# Patient Record
Sex: FEMALE | Race: OTHER | HISPANIC OR LATINO | URBAN - METROPOLITAN AREA
[De-identification: names, ages, dates, MRNs, and addresses within clinical notes are randomized per-mention and may not be internally consistent; named-entity substitution may affect disease eponyms.]

---

## 2016-12-30 NOTE — ED STATDOCS - GASTROINTESTINAL, MLM
abdomen soft, and non-distended. RUQ and RLQ TTP, voluntary guarding, no rebound.  Bowel sounds present.

## 2016-12-30 NOTE — ED STATDOCS - MEDICAL DECISION MAKING DETAILS
patient with abd pain, mostly RLQ. Notes vaginal bleeding. unknown pregnancy at time of eval. if pregnant, concern for torsion vs ectopic. called US stat to arrange for sonography. urine sample taken for test but awaiting result. if not pregnant concern for appy and will need CT with IV and PO contrast. appropriate lab work ordered and treated symptomatically with fluids, anti emetics ans pain meds. check results and reassesses.

## 2016-12-30 NOTE — ED STATDOCS - PROGRESS NOTE DETAILS
SUDHA paged and made aware of pts clinical condition. Pts VSS, NAD. Will evaluate pt in ED pending labs. SUDHA evaluated pt-- will admit to Dr. Severino.

## 2016-12-30 NOTE — ED STATDOCS - OBJECTIVE STATEMENT
32 y/o F presenting for evaluation of abd pain that began at 2pm today. Abdominal pain is described as sharp and mostly located in RLQ. Pain radiates to epigastric. + nausea, Denies vomiting.   Pt now with vaginal bleeding, onset around 5pm. Denies cough, CP, SOB or other complaints.  Pt is sexually active. LMP 12/18 and she states these sx are not her menses and she is usually regular.

## 2016-12-31 NOTE — H&P ADULT. - ASSESSMENT
31 y.o F [id number] LMP 16 EGA 1.6 complaints of abdominal pain & vaginal bleeding x1 day.    Admit to OB/GYN service

## 2016-12-31 NOTE — H&P ADULT. - PMH
Gastritis    Gestational diabetes    Hypertension    Tachycardia    UTI (lower urinary tract infection)

## 2016-12-31 NOTE — H&P ADULT. - PROBLEM SELECTOR PLAN 1
cbc at 12noon then repeat in am  type and screen   BHCG 323 now but will repeat in am for trend  US repeat in am  LR bolus received in ED will continue at 125 cc/hr  Clear diet for now   Tylenol 975 mg given in ED but will not order any other pain meds due to monitoring for acute abdomin  Admit for 24hours observation to montior abdominal symptoms, patient with early pregnancy cannot exculde ecoptic vs torsion vs rupture cyst.  montior closely

## 2016-12-31 NOTE — H&P ADULT. - GENITOURINARY COMMENTS
No CMT, + vaginal bleeding noted, + R adnexal pain upon palpation uterus normal size left adnexal pain

## 2016-12-31 NOTE — ED ADULT NURSE REASSESSMENT NOTE - NS ED NURSE REASSESS COMMENT FT1
Pt still waiting for bed, spoke with Dr. Salas Katz for pain med order.
pt A&Ox3, resp even and unlabored no distress noted, pt has no complaints at this time, family at bedside, will continue to monitor
Pt received @ 0725 Alert and Oriented to person, place, and time, pt is complaining of upper abdominal pain and thinks its coming from not eating.  Pt also states she is have more vaginal bleeding since yesterday.  Call out to MD.  will cont to monitor.

## 2016-12-31 NOTE — H&P ADULT. - HISTORY OF PRESENT ILLNESS
31 y.o F [id number] LMP 16 EGA 1.6 complaints of abdominal pain & vaginal bleeding x1 day.  The abd pain that began at 2pm today. She described as sharp and mostly located in RLQ. Pain radiates to epigastric. + nausea, Denies vomiting.   Pt now with vaginal bleeding, onset around 5pm. only used 1 pad. Denies cough, CP, SOB or other complaints.  Pt is sexually active. She states these sx are not her menses and she is usually regular.  OBHX 1NSVD, 1 csection due to breech presentation  GYN hx- PAP smear normal

## 2016-12-31 NOTE — CONSULT NOTE ADULT - ATTENDING COMMENTS
I have personally seen and examined this pt in the ER. QSBHCG and pelvic ultrasound results have been reviewed by me. On exam, abd soft without distention, BS present. Mild RLQ>LLQ pain on deep palpation noted. NO REBOUND PRESENT. BUSV small amount of dark blood present. Cx clean without lesions, and NO CMT NOTED. Uterus nl size, NT, mobile. Left adnexa NT without masses. Right adnexa slightly tender to palpation.             I have discussed the possible DX with the pt + . Although this may be an early ectopic pregnancy, she is not acute at this time and her current condition does not warrant laparoscopy, we should not send her home. Additional possibilities might be hemorrhagic cyst, and very early AP. We will admit pt to GYN service, follow CBC and serial QSBHCG closely, and repeat pelvic ultrasound in 24 hrs. If her clinical status changes or pelvic ultrasound evidence becomes more convincing, we will initiate operative intervention

## 2016-12-31 NOTE — CONSULT NOTE ADULT - SUBJECTIVE AND OBJECTIVE BOX
31 y.o F [id number] LMP 16 EGA 1.6 complaints of abdominal pain & vaginal bleeding x1 day.  The abd pain that began at 2pm today. She described as sharp and mostly located in RLQ. Pain radiates to epigastric. + nausea, Denies vomiting.   Pt now with vaginal bleeding, onset around 5pm. only used 1 pad. Denies cough, CP, SOB or other complaints.  Pt is sexually active. She states these sx are not her menses and she is usually regular.  OBHX 1NSVD, 1 csection due to breech presentation  GYN hx- PAP smear normal      VS:   Vital Signs Last 24 Hrs  T(C): 37.7, Max: 38.3 (12-30 @ 21:44)  T(F): 99.9, Max: 101 (12-30 @ 21:44)  HR: 97 (97 - 119)  BP: 128/75 (128/75 - 146/91)  BP(mean): --  RR: 18 (18 - 18)  SpO2: 100% (100% - 100%)    Physical Exam:     General: Pt appears calm and relaxed in bed  Respiratory: BL CTA, no wheezes, rubs, nor rhonchi, good BL air entry  Cardiovascular: regular rate and rhythm, +S1/+S2, no murmurs, rubs, gallops  Gastrointestinal: soft slightly distended, + RLQ pain upon light and deep palpation mild rebound , no guarding +bowel sounds   Extremities: no clubbing, cyanosis, erythema,  No lower extremity edema    Labs:                        13.3   11.50 )-----------( 236      ( 31 Dec 2016 02:37 )             40.3   31 Dec 2016 02:37    136    |  102    |  9.0    ----------------------------<  111    3.5     |  22.0   |  0.46     Ca    9.3        31 Dec 2016 02:37    TPro  7.6    /  Alb  3.9    /  TBili  0.2    /  DBili  x      /  AST  16     /  ALT  18     /  AlkPhos  93     31 Dec 2016 02:37        Medications:  MEDICATIONS  (STANDING):  sodium chloride 0.9% Bolus 1000milliLiter(s) IV Bolus once  sodium chloride 0.9%. 1000milliLiter(s) IV Continuous <Continuous>  EXAM:  US OB LES THAN 14 WKS 1ST GEST                         EXAM:  US OB TRANSVAGINAL                          PROCEDURE DATE:  2016        INTERPRETATION:  CLINICAL INFORMATION: Severe right lower quadrant pain   since 2:00 PM with vaginal bleeding since 5:00 PM. Positive urine   pregnancy examination. Beta-hCG not available at the time of examination   and dictation. Negative UCG on prior emergency room visit on 2016.    LMP: 2016    COMPARISON: None available.    TECHNIQUE: Transabdominal and Endovaginal pelvic sonogram. Color and   Spectral Doppler was performed.    FINDINGS:    Uterus: 9.0 x 7.1 x 5.2 cm. Within normal limits.  Endometrium: 13 mm. Within normal limits.  Right ovary: 6.2 x 5.4 x 4.4 cm inclusive of 2 complex cysts measuring   4.1 x 3.9 x 3.1 cm and 3.9 x 3.6 x 3.2 cm.  Blood flow is demonstrated   utilizing color and spectral Doppler.  Left ovary: 2.8 x 2.4 x 2.1 cm, inclusive of a 1.4 x 1.3 x 1.2 cm cyst.   Nonspecific echogenic subcentimeter focus within the left ovary. Blood   flow demonstrated utilizing color and spectral Doppler.     Free fluid: Large complex pelvic free fluid. No free fluid is identified   within the right upper quadrant.    IMPRESSION:    Enlarged right ovary with 2 complex hemorrhagic cysts. Despite the   presence of arterial and venous blood flow, given the size of the ovary,   the patient is a increased risk for ovarian torsion and intermittent   torsion detorsion is a differential diagnostic consideration.    Large complex pelvic free fluid, may be related to ruptured hemorrhagic   cyst, however in light of the positive urine pregnancy examination and   lack of a intrauterine pregnancy, findings represent pregnancy of unknown   location. Nonvisualized ruptured ectopic pregnancy is a differential   diagnostic consideration, however given recent negative urine pregnancy   examination on 2016, is felt to be less likely. Close interval   follow-up with serial beta-hCG and ultrasound is recommended.    Dr. Subramanian discussed the above findings with JETT Patterson  at 2:05 AM on   2016 with read back.

## 2017-01-01 NOTE — DISCHARGE NOTE OB - CARE PROVIDER_API CALL
Jude Macias (MD), Obstetrics and Gynecology  [address] [place] [address]  Phone: [phone]  Fax: [phone]

## 2017-01-01 NOTE — DISCHARGE NOTE OB - HOSPITAL COURSE
31 year old  at EGA of approx. 1 week 6 days calculated via LMP of 16 came into L/D complaining of right sided lower quadrant pain which began the day prior to admission. The patient denied vomiting, denies fever or chills. Also had 1 days of vaginal bleeding, but no vaginal spotting and no passage of clots.  Ultrasound showed enlarged right ovary with 2 complex hemorrhagic cysts. Patient had positive arterial and venous blood flow. There was large free complex pelvic fluid. Nonvisualized ruptured ectopic pregnancy wasa differential diagnostic consideration, however given recent negative urine pregnancy examination on 2016, is felt to be less likely.  Patient most likely has lutein cyst, repeat beta-hcg is rising from 321 on 16 to 551 on 17. Patient has clinically improved from the day prior, now rates pain 7/10, is ambulating, tolerating PO intake, and is urinating. Patient to be discharged with follow up at the [facility] clinic within 3 to 5 days for repeat beta-hcg.

## 2017-01-01 NOTE — DISCHARGE NOTE OB - PLAN OF CARE
control of symptoms -most likely has luteal cyst, follow up at the [facility] clinic within 3 to 5 days   -will need repeat beta-hcg and repeat transvaginal ultrasound

## 2017-01-01 NOTE — DISCHARGE NOTE OB - PATIENT PORTAL LINK FT
“You can access the Follow[employer] Patient Portal, offered by [facility], by registering with the following website: [email or web address]”

## 2017-01-01 NOTE — PROGRESS NOTE ADULT - PROBLEM SELECTOR PLAN 1
-differentials are early IUP too small to visualize with hemorrhagic cyst of the ovary vs.  ectopic pregnancy  vs. intermittent ovarian torsion  -beta-hcg increasing from yesterday (551 vs 323)  -repeat transvaginal ultrasound due for today  -patient has subjectively improved, pain is now rated 7/10

## 2017-01-01 NOTE — DISCHARGE NOTE OB - MEDICATION SUMMARY - MEDICATIONS TO TAKE
I will START or STAY ON the medications listed below when I get home from the hospital:     mg oral tablet  -- 1 tab(s) by mouth every 6 hours for moderate to severe pain  -- Do not take this drug if you are pregnant.  It is very important that you take or use this exactly as directed.  Do not skip doses or discontinue unless directed by your doctor.  May cause drowsiness or dizziness.  Obtain medical advice before taking any non-prescription drugs as some may affect the action of this medication.  Take with food or milk.    -- Indication: For pain

## 2017-01-01 NOTE — PROGRESS NOTE ADULT - SUBJECTIVE AND OBJECTIVE BOX
31 year old [id number] LMP 16 EGA 1.6 complaints of abdominal pain & vaginal bleeding x1 day.  The abd pain that began at 2pm yesterday. Patient says the abdominal pain is a lot better today than yesterday, now she rates it 7/10 whereas yesterday it was 9/10. Patient is tolerating PO, urinating, bowel movements positive and ambulating. Denies vaginal bleeding, denies vaginal discharge.     Vitals:  Vital Signs Last 24 Hrs  T(C): 36.3, Max: 36.8 ( @ 07:30)  T(F): 97.3, Max: 98.2 ( @ 07:30)  HR: 89 (88 - 98)  BP: 123/79 (118/74 - 132/84)  BP(mean): --  RR: 18 (16 - 18)  SpO2: 98% (98% - 100%)    Physical exam:  General: NAD  Cardiac: s1/s2/rrr  Pulmonary: CTAb  Abdominal: soft/bowel sounds increased/tenderness to palpation in right lower and left lower quadrants, no guarding, no rebound, no rigidity  Extremities: no edema, no calf tenderness no cyanosis     Labs:                          12.9   10.92 )-----------( 238      ( 31 Dec 2016 12:59 )             39.6       31 Dec 2016 02:37    136    |  102    |  9.0    ----------------------------<  111    3.5     |  22.0   |  0.46     Ca    9.3        31 Dec 2016 02:37    TPro  7.6    /  Alb  3.9    /  TBili  0.2    /  DBili  x      /  AST  16     /  ALT  18     /  AlkPhos  93     31 Dec 2016 02:37      beta-hc

## 2017-01-01 NOTE — DISCHARGE NOTE OB - CARE PLAN
Principal Discharge DX:	Lutein cyst  Goal:	control of symptoms  Instructions for follow-up, activity and diet:	-most likely has luteal cyst, follow up at the [facility] clinic within 3 to 5 days   -will need repeat beta-hcg and repeat transvaginal ultrasound Principal Discharge DX:	Lutein cyst  Goal:	control of symptoms  Instructions for follow-up, activity and diet:	-most likely has luteal cyst, follow up at the [facility] clinic within 3 to 5 days   -will need repeat beta-hcg and repeat transvaginal ultrasound Principal Discharge DX:	Lutein cyst  Goal:	control of symptoms  Instructions for follow-up, activity and diet:	-most likely has luteal cyst, follow up at the [facility] clinic within 3 to 5 days   -will need repeat beta-hcg and repeat transvaginal ultrasound

## 2017-01-02 ENCOUNTER — INPATIENT (INPATIENT)
Facility: HOSPITAL | Age: 32
LOS: 1 days | Discharge: HOME | DRG: 779 | End: 2017-01-04
Attending: OBSTETRICS & GYNECOLOGY | Admitting: OBSTETRICS & GYNECOLOGY
Payer: MEDICAID

## 2017-01-02 VITALS
RESPIRATION RATE: 18 BRPM | DIASTOLIC BLOOD PRESSURE: 79 MMHG | OXYGEN SATURATION: 100 % | TEMPERATURE: 98 F | HEART RATE: 110 BPM | SYSTOLIC BLOOD PRESSURE: 132 MMHG

## 2017-01-02 DIAGNOSIS — Z98.89 OTHER SPECIFIED POSTPROCEDURAL STATES: Chronic | ICD-10-CM

## 2017-01-02 DIAGNOSIS — D17.9 BENIGN LIPOMATOUS NEOPLASM, UNSPECIFIED: Chronic | ICD-10-CM

## 2017-01-02 LAB
ALBUMIN SERPL ELPH-MCNC: 4.1 G/DL — SIGNIFICANT CHANGE UP (ref 3.3–5.2)
ALP SERPL-CCNC: 104 U/L — SIGNIFICANT CHANGE UP (ref 40–120)
ALT FLD-CCNC: 24 U/L — SIGNIFICANT CHANGE UP
ANION GAP SERPL CALC-SCNC: 16 MMOL/L — SIGNIFICANT CHANGE UP (ref 5–17)
APPEARANCE UR: ABNORMAL
AST SERPL-CCNC: 17 U/L — SIGNIFICANT CHANGE UP
BASOPHILS # BLD AUTO: 0 K/UL — SIGNIFICANT CHANGE UP (ref 0–0.2)
BASOPHILS NFR BLD AUTO: 0.3 % — SIGNIFICANT CHANGE UP (ref 0–2)
BILIRUB SERPL-MCNC: 0.2 MG/DL — LOW (ref 0.4–2)
BILIRUB UR-MCNC: NEGATIVE — SIGNIFICANT CHANGE UP
BLD GP AB SCN SERPL QL: SIGNIFICANT CHANGE UP
BUN SERPL-MCNC: 8 MG/DL — SIGNIFICANT CHANGE UP (ref 8–20)
CALCIUM SERPL-MCNC: 8.8 MG/DL — SIGNIFICANT CHANGE UP (ref 8.6–10.2)
CHLORIDE SERPL-SCNC: 104 MMOL/L — SIGNIFICANT CHANGE UP (ref 98–107)
CO2 SERPL-SCNC: 21 MMOL/L — LOW (ref 22–29)
COLOR SPEC: YELLOW — SIGNIFICANT CHANGE UP
CREAT SERPL-MCNC: 0.54 MG/DL — SIGNIFICANT CHANGE UP (ref 0.5–1.3)
DIFF PNL FLD: ABNORMAL
EOSINOPHIL # BLD AUTO: 0.1 K/UL — SIGNIFICANT CHANGE UP (ref 0–0.5)
EOSINOPHIL NFR BLD AUTO: 0.8 % — SIGNIFICANT CHANGE UP (ref 0–6)
EPI CELLS # UR: ABNORMAL
GLUCOSE SERPL-MCNC: 95 MG/DL — SIGNIFICANT CHANGE UP (ref 70–115)
GLUCOSE UR QL: NEGATIVE MG/DL — SIGNIFICANT CHANGE UP
HCG SERPL-ACNC: 565.5 MIU/ML — SIGNIFICANT CHANGE UP
HCG UR QL: POSITIVE
HCT VFR BLD CALC: 40.6 % — SIGNIFICANT CHANGE UP (ref 37–47)
HGB BLD-MCNC: 13.3 G/DL — SIGNIFICANT CHANGE UP (ref 12–16)
KETONES UR-MCNC: ABNORMAL
LEUKOCYTE ESTERASE UR-ACNC: ABNORMAL
LYMPHOCYTES # BLD AUTO: 3.5 K/UL — SIGNIFICANT CHANGE UP (ref 1–4.8)
LYMPHOCYTES # BLD AUTO: 33.8 % — SIGNIFICANT CHANGE UP (ref 20–55)
MCHC RBC-ENTMCNC: 27.2 PG — SIGNIFICANT CHANGE UP (ref 27–31)
MCHC RBC-ENTMCNC: 32.8 G/DL — SIGNIFICANT CHANGE UP (ref 32–36)
MCV RBC AUTO: 83 FL — SIGNIFICANT CHANGE UP (ref 81–99)
MONOCYTES # BLD AUTO: 0.8 K/UL — SIGNIFICANT CHANGE UP (ref 0–0.8)
MONOCYTES NFR BLD AUTO: 7.3 % — SIGNIFICANT CHANGE UP (ref 3–10)
NEUTROPHILS # BLD AUTO: 6 K/UL — SIGNIFICANT CHANGE UP (ref 1.8–8)
NEUTROPHILS NFR BLD AUTO: 57.6 % — SIGNIFICANT CHANGE UP (ref 37–73)
NITRITE UR-MCNC: NEGATIVE — SIGNIFICANT CHANGE UP
PH UR: 6 — SIGNIFICANT CHANGE UP (ref 4.8–8)
PLATELET # BLD AUTO: 235 K/UL — SIGNIFICANT CHANGE UP (ref 150–400)
POTASSIUM SERPL-MCNC: 3.4 MMOL/L — LOW (ref 3.5–5.3)
POTASSIUM SERPL-SCNC: 3.4 MMOL/L — LOW (ref 3.5–5.3)
PROT SERPL-MCNC: 7.8 G/DL — SIGNIFICANT CHANGE UP (ref 6.6–8.7)
PROT UR-MCNC: 30 MG/DL
RBC # BLD: 4.89 M/UL — SIGNIFICANT CHANGE UP (ref 4.4–5.2)
RBC # FLD: 13.9 % — SIGNIFICANT CHANGE UP (ref 11–15.6)
RBC CASTS # UR COMP ASSIST: ABNORMAL /HPF (ref 0–4)
SODIUM SERPL-SCNC: 141 MMOL/L — SIGNIFICANT CHANGE UP (ref 135–145)
SP GR SPEC: 1.02 — SIGNIFICANT CHANGE UP (ref 1.01–1.02)
TYPE + AB SCN PNL BLD: SIGNIFICANT CHANGE UP
UROBILINOGEN FLD QL: NEGATIVE MG/DL — SIGNIFICANT CHANGE UP
WBC # BLD: 10.37 K/UL — SIGNIFICANT CHANGE UP (ref 4.8–10.8)
WBC # FLD AUTO: 10.37 K/UL — SIGNIFICANT CHANGE UP (ref 4.8–10.8)
WBC UR QL: SIGNIFICANT CHANGE UP

## 2017-01-02 PROCEDURE — 76801 OB US < 14 WKS SINGLE FETUS: CPT | Mod: 26

## 2017-01-02 PROCEDURE — 93976 VASCULAR STUDY: CPT | Mod: 26

## 2017-01-02 PROCEDURE — 76705 ECHO EXAM OF ABDOMEN: CPT | Mod: 26

## 2017-01-02 PROCEDURE — 76815 OB US LIMITED FETUS(S): CPT | Mod: 26

## 2017-01-02 PROCEDURE — 76817 TRANSVAGINAL US OBSTETRIC: CPT | Mod: 26

## 2017-01-02 RX ORDER — SODIUM CHLORIDE 9 MG/ML
1000 INJECTION INTRAMUSCULAR; INTRAVENOUS; SUBCUTANEOUS
Qty: 0 | Refills: 0 | Status: DISCONTINUED | OUTPATIENT
Start: 2017-01-02 | End: 2017-01-04

## 2017-01-02 RX ORDER — SODIUM CHLORIDE 9 MG/ML
1000 INJECTION INTRAMUSCULAR; INTRAVENOUS; SUBCUTANEOUS ONCE
Qty: 0 | Refills: 0 | Status: COMPLETED | OUTPATIENT
Start: 2017-01-02 | End: 2017-01-02

## 2017-01-02 RX ORDER — ACETAMINOPHEN 500 MG
975 TABLET ORAL ONCE
Qty: 0 | Refills: 0 | Status: COMPLETED | OUTPATIENT
Start: 2017-01-02 | End: 2017-01-02

## 2017-01-02 RX ADMIN — Medication 975 MILLIGRAM(S): at 22:53

## 2017-01-02 RX ADMIN — SODIUM CHLORIDE 1000 MILLILITER(S): 9 INJECTION INTRAMUSCULAR; INTRAVENOUS; SUBCUTANEOUS at 22:53

## 2017-01-02 NOTE — CONSULT NOTE ADULT - SUBJECTIVE AND OBJECTIVE BOX
31 year old female  [id number] LMP 16 EGA 2.1 who presnts to the ED with complaints of abdominal pain & vaginal bleeding. The patient was previously admitted to [facility] for two days because of the same symptoms but was discharged with follow up at the [facility] clinic on Wednesday for repeat beta-hcg and TV ultrasound, however she had one episode of bleeding and abdominal pain and came to the ED. The patient states that the bleeding was bright red, with no passage of clots and no tissues were seen. Denies vertigo, denies shortness of breath or chest pain.    The abdominal pain is located in the RLQ and the patient states that it also radiates to the epigastric region. She states that pain she has in RLQ is pulsating, but the pain she has in the epigastric region is burning and states it is similar to the burning pain she had when she had gastritis previously, but states that it is a bit stronger. She says the pain is 8/10, constant and gets worse with respiration. Not alleviated by ibuprofen.  Patient was recently treated for H. Pylori infection and she just finished the triple antibiotic therapy about 2 weeks ago.       OBHX 1  in  without complications, 1 csection due to breech presentation in   GYN hx-2015 PAP smear alondra    Vitals:   Vital Signs Last 24 Hrs  T(C): 36.7, Max: 36.7 (- @ 17:05)  T(F): 98, Max: 98 (- @ 17:05)  HR: 110 (110 - 110)  BP: 132/79 (132/79 - 132/79)  BP(mean): --  RR: 18 (18 - 18)  SpO2: 100% (100% - 100%) 31 year old female  [id number] LMP 16 EGA 2.1 who presents to the ED with complaints of abdominal pain & vaginal bleeding. The patient was previously admitted to [facility] for two days because of the same symptoms but was discharged with follow up at the [facility] clinic on Wednesday for repeat beta-hcg and TV ultrasound, however she had one episode of bleeding and abdominal pain and came to the ED. The patient states that the bleeding was bright red, with no passage of clots and no tissues were seen. Used two pads.  Denies vertigo, denies shortness of breath or chest pain.    The abdominal pain is located in the RLQ and the patient states that it also radiates to the epigastric region. She states that pain she has in RLQ is pulsating, but the pain she has in the epigastric region is burning and states it is similar to the burning pain she had when she had gastritis previously, but states that it is a bit stronger. She says the pain is 8/10, constant and gets worse with respiration. Not alleviated by ibuprofen.  Patient was recently treated for H. Pylori infection and she just finished the triple antibiotic therapy about 2 weeks ago.       OBHX 1  in  without complications, 1 csection due to breech presentation in   GYN hx-2015 PAP smear alondra    Vitals:   Vital Signs Last 24 Hrs  T(C): 36.7, Max: 36.7 (-02 @ 17:05)  T(F): 98, Max: 98 (01-02 @ 17:05)  HR: 110 (110 - 110)  BP: 132/79 (132/79 - 132/79)  BP(mean): --  RR: 18 (18 - 18)  SpO2: 100% (100% - 100%)    Physical exam:  General: NAD  Cardiac: s1/s2/rrr  Pulmonary: CTAB  Abdominal: fundal height not-palpable, tenderness to palpation in the RLQ and LLQ as well as the RUQ and epigastric region, however Tillman's sign is negative, via percussion liver is ax costal margin, bowel sounds slightly increased, no guarding, no rebound  Extremities: no edema, no calf tenderness, no cyanosis    Speculum exam: normal external genitalia, normal labia minora and majora, vagina normal without lesions, no blood seen in vaginal vault, cervix appears closed, without apparent lesions.   Cervical exam: no CMT, cervix is closed, slight adnexal tenderness bilaterally, but no masses appreciated      Labs:                          13.3   10.37 )-----------( 235      ( 2017 22:22 )             40.6       2017 22:22    141    |  104    |  8.0    ----------------------------<  95     3.4     |  21.0   |  0.54     Ca    8.8        2017 22:22    TPro  7.8    /  Alb  4.1    /  TBili  0.2    /  DBili  x      /  AST  17     /  ALT  24     /  AlkPhos  104    2017 22:22      beta-HCG=565 from 581 from the previous day    Ultrasound:   Right ovary: 7.3 x 4.2 x 5.7 cm containing 2 complex cysts measuring 3.7 x 3.5 x 3.3 cm and 2.8 x 3.0 x 2.6 cm. Blood flow demonstrated utilizing   color and spectral Doppler.  Left ovary: 2.6 x 2.3 x 1.8 cm., contains a nonspecific echogenic structure measuring approximately 7 mm. Blood flow demonstrated utilizing   color and spectral Doppler.  Free fluid:Moderate to large complex free fluid within the cul-de-sac and surrounding bilateral adnexa. No right upper quadrant free fluid.

## 2017-01-02 NOTE — ED STATDOCS - MEDICAL DECISION MAKING DETAILS
persistent abd pain with associated vaginal bleeding, seen about 2 days ago, US revealed Large cyst but no obvious IUP D/C with plan to follow beta HCG with US. Line/labs with HCG and US to evaluate for early/threatened vs ectopic incidentally note RUQ pain increased with inspiration US to evaluate for gallstones, consult GYN.

## 2017-01-02 NOTE — ED STATDOCS - ATTENDING CONTRIBUTION TO CARE
I, Jose Elias, performed the initial face to face bedside interview with this patient regarding history of present illness, review of symptoms and relevant past medical, social and family history.  I completed an independent physical examination.  I was the initial provider who evaluated this patient. I have signed out the follow up of any pending tests (i.e. labs, radiological studies) to the ACP.  I have communicated the patient’s plan of care and disposition with the ACP.  The history, relevant review of systems, past medical and surgical history, medical decision making, and physical examination was documented by the scribe in my presence and I attest to the accuracy of the documentation.

## 2017-01-02 NOTE — CONSULT NOTE ADULT - PROBLEM SELECTOR RECOMMENDATION 9
-beta-hcg is decreasing   -possible differential diagnosis are ruptured ectopic pregnancy vs. missed  with ruptured luteal/hemorrhagic cyst  -lactated ringer 125 cc/hour

## 2017-01-02 NOTE — ED STATDOCS - OBJECTIVE STATEMENT
32 y/o F presents to the ED c/o nausea and is describing upper quadrant pain right greater than left. No vomiting, no fever, no chills, No urinary sx.

## 2017-01-02 NOTE — ED ADULT NURSE NOTE - OBJECTIVE STATEMENT
pt arrived with vaginal bleeding, pt states she is 3 weeks pregnant, and started having bleeding again pt was at ed yesterday for same thing and sent home, pt with some mild cramping

## 2017-01-02 NOTE — ED STATDOCS - ENMT, MLM
ears with clear tympanic membranes bilaterally.  Nasal mucosa clear.  Mouth with normal mucosa  Throat has no vesicles, no oropharyngeal exudates and uvula is midline.  Face with no lymph node enlargement.

## 2017-01-02 NOTE — ED STATDOCS - NS ED MD SCRIBE ATTENDING SCRIBE SECTIONS
VITAL SIGNS( Pullset)/DISPOSITION/PHYSICAL EXAM/PAST MEDICAL/SURGICAL/SOCIAL HISTORY/HISTORY OF PRESENT ILLNESS/HIV/REVIEW OF SYSTEMS

## 2017-01-03 DIAGNOSIS — O20.9 HEMORRHAGE IN EARLY PREGNANCY, UNSPECIFIED: ICD-10-CM

## 2017-01-03 DIAGNOSIS — K29.70 GASTRITIS, UNSPECIFIED, WITHOUT BLEEDING: ICD-10-CM

## 2017-01-03 LAB — HCG SERPL-ACNC: 319.6 MIU/ML — SIGNIFICANT CHANGE UP

## 2017-01-03 PROCEDURE — 99285 EMERGENCY DEPT VISIT HI MDM: CPT

## 2017-01-03 RX ADMIN — SODIUM CHLORIDE 150 MILLILITER(S): 9 INJECTION INTRAMUSCULAR; INTRAVENOUS; SUBCUTANEOUS at 02:20

## 2017-01-03 RX ADMIN — SODIUM CHLORIDE 150 MILLILITER(S): 9 INJECTION INTRAMUSCULAR; INTRAVENOUS; SUBCUTANEOUS at 11:11

## 2017-01-03 RX ADMIN — SODIUM CHLORIDE 150 MILLILITER(S): 9 INJECTION INTRAMUSCULAR; INTRAVENOUS; SUBCUTANEOUS at 18:35

## 2017-01-03 NOTE — H&P ADULT. - HISTORY OF PRESENT ILLNESS
31 year old female  [id number] LMP 16 EGA 2.1 who presents to the ED with complaints of abdominal pain & vaginal bleeding. The patient was previously admitted to [facility] for two days because of the same symptoms but was discharged with follow up at the [facility] clinic on Wednesday for repeat beta-hcg and TV ultrasound, however she had one episode of bleeding and abdominal pain and came to the ED. The patient states that the bleeding was bright red, with no passage of clots and no tissues were seen. Used two pads.  Denies vertigo, denies shortness of breath or chest pain.    The abdominal pain is located in the RLQ and the patient states that it also radiates to the epigastric region. She states that pain she has in RLQ is pulsating, but the pain she has in the epigastric region is burning and states it is similar to the burning pain she had when she had gastritis previously, but states that it is a bit stronger. She says the pain is 8/10, constant and gets worse with respiration. Not alleviated by ibuprofen.  Patient was recently treated for H. Pylori infection and she just finished the triple antibiotic therapy about 2 weeks ago.

## 2017-01-03 NOTE — PROGRESS NOTE ADULT - SUBJECTIVE AND OBJECTIVE BOX
31y G_P_ presents with     HPI:  31 year old female  [id number] LMP 16 EGA 2.1 who presents to the ED with complaints of abdominal pain & vaginal bleeding. The patient was previously admitted to [facility] for two days because of the same symptoms but was discharged with follow up at the [facility] clinic on Wednesday for repeat beta-hcg and TV ultrasound, however she had one episode of bleeding and abdominal pain and came to the ED. The patient states that the bleeding was bright red, with no passage of clots and no tissues were seen. Used two pads.  Denies vertigo, denies shortness of breath or chest pain.    The abdominal pain is located in the RLQ and the patient states that it also radiates to the epigastric region. She states that pain she has in RLQ is pulsating, but the pain she has in the epigastric region is burning and states it is similar to the burning pain she had when she had gastritis previously, but states that it is a bit stronger. She says the pain is 8/10, constant and gets worse with respiration. Not alleviated by ibuprofen.  Patient was recently treated for H. Pylori infection and she just finished the triple antibiotic therapy about 2 weeks ago. (2017 01:23)    Addendum  On an additional evaluation at 4:30am, the patient reports less pain than on prior days        Vital Signs Last 24 Hrs  T(C): 36.4, Max: 37.1 ( @ 03:16)  T(F): 97.6, Max: 98.7 ( @ 03:16)  HR: 94 (90 - 110)  BP: 112/69 (103/71 - 132/79)  BP(mean): --  RR: 18 (18 - 20)  SpO2: 99% (99% - 100%)Last Menstrual Period        No Known Allergies    Intolerances      MEDS:    PHYSICAL EXAM:  CHEST/LUNG: Clear to percussion bilaterally; No rales, rhonchi, wheezing, or rubs  HEART: Regular rate and rhythm; No murmurs, rubs, or gallops  ABDOMEN: Soft, Nontender, Nondistended; Bowel sounds present  EXTREMITIES:  2+ Peripheral Pulses, No clubbing, cyanosis, or edema  PELVIC:        deferred for now    LABS:                        13.3   10.37 )-----------( 235      ( 2017 22:22 )             40.6     2017 22:22    141    |  104    |  8.0    ----------------------------<  95     3.4     |  21.0   |  0.54     Ca    8.8        2017 22:22    TPro  7.8    /  Alb  4.1    /  TBili  0.2    /  DBili  x      /  AST  17     /  ALT  24     /  AlkPhos  104    2017 22:22    Urinalysis Basic - ( 2017 23:01 )    Color: Yellow / Appearance: Slightly Turbid / S.020 / pH: x  Gluc: x / Ketone: Trace  / Bili: Negative / Urobili: Negative mg/dL   Blood: x / Protein: 30 mg/dL / Nitrite: Negative   Leuk Esterase: Small / RBC: 6-10 /HPF / WBC 0-2   Sq Epi: x / Non Sq Epi: Moderate / Bacteria: x      RADIOLOGY STUDIES: Pelvic Ultrasound    IMPRESSION:    Pregnancy of unknown location.    Moderate to large complex free fluid within the pelvis. Differential   diagnostic considerations again include ruptured cyst versus ruptured   ectopic pregnancy.    Interval increase in heterogeneity and thickness of the endometrium since   prior examination.

## 2017-01-03 NOTE — H&P ADULT. - GENITOURINARY COMMENTS
complains of vaginal bleeding normal labia minora and majora, normal vagina without lesions, cervix without discharge, appears closed, no CMT, slight right and left sided adnexal tenderness, no masses are palpated

## 2017-01-03 NOTE — H&P ADULT. - NEGATIVE GASTROINTESTINAL SYMPTOMS
no steatorrhea/no vomiting/no hematochezia/no melena/no jaundice/no diarrhea/no change in bowel habits/no constipation/no flatulence

## 2017-01-03 NOTE — H&P ADULT. - ASSESSMENT
31 year old female  [id number] LMP 16 EGA 2.1 who presents to the ED with complaints of abdominal pain & vaginal bleeding. Beta-HCG is decreasing from previous value. Most likely non-viable pregnancy, however cervix is closed.

## 2017-01-03 NOTE — H&P ADULT. - PROBLEM SELECTOR PLAN 2
-patient has mild symptoms will hold pantoprazole as patient is in 1st trimester  - -patient has mild symptoms will hold pantoprazole as patient is in 1st trimester

## 2017-01-03 NOTE — H&P ADULT. - GASTROINTESTINAL DETAILS
no rigidity/no rebound tenderness/no distention/no bruit/bowel sounds normal/no masses palpable/no guarding

## 2017-01-03 NOTE — PROGRESS NOTE ADULT - ASSESSMENT
Ruptured cyst vs missed ab  Clinical exam is less likely for a ruptured ectopic  She desires the pregnancy and declines D&C which could lead to a definitive diagnosis, if products are produced.    The low beta level, which is rising, suggests that the possibility of an early IUP still exists

## 2017-01-03 NOTE — H&P ADULT. - PROBLEM SELECTOR PLAN 1
-differentials are missed  with ruptured luteal cyst  vs. ruptured ectopic pregnancy  -admit to ob-gyn surgery under Dr. Tillman  -serial beta-hcg  -ambulate as tolerated, diet NPO  -CBC/CMP to be repeated in the AM  -type and screen -differentials are missed  with ruptured luteal cyst  vs. ruptured ectopic pregnancy  -admit to ob-gyn surgery under Dr. Tillman  -serial beta-hcg  -ambulate as tolerated, diet NPO  -CBC/CMP to be repeated in the AM  -type and screen  -tylenol for mild to  moderate pain, percocet for severe pain -differentials are missed  with ruptured luteal cyst  vs. ruptured ectopic pregnancy  -admit to ob-gyn  under service   Dr. Tillman is the admitting attending  -serial beta-hcg  -ambulate as tolerated, diet NPO  -CBC/CMP to be repeated in the AM  -type and screen  -tylenol for mild to  moderate pain, percocet for severe pain

## 2017-01-04 VITALS
OXYGEN SATURATION: 100 % | HEART RATE: 88 BPM | TEMPERATURE: 98 F | RESPIRATION RATE: 18 BRPM | SYSTOLIC BLOOD PRESSURE: 119 MMHG | DIASTOLIC BLOOD PRESSURE: 76 MMHG

## 2017-01-04 LAB
ALBUMIN SERPL ELPH-MCNC: 3.6 G/DL — SIGNIFICANT CHANGE UP (ref 3.3–5.2)
ALP SERPL-CCNC: 89 U/L — SIGNIFICANT CHANGE UP (ref 40–120)
ALT FLD-CCNC: 16 U/L — SIGNIFICANT CHANGE UP
ANION GAP SERPL CALC-SCNC: 14 MMOL/L — SIGNIFICANT CHANGE UP (ref 5–17)
AST SERPL-CCNC: 13 U/L — SIGNIFICANT CHANGE UP
BASOPHILS # BLD AUTO: 0 K/UL — SIGNIFICANT CHANGE UP (ref 0–0.2)
BASOPHILS NFR BLD AUTO: 0.7 % — SIGNIFICANT CHANGE UP (ref 0–2)
BILIRUB SERPL-MCNC: 0.4 MG/DL — SIGNIFICANT CHANGE UP (ref 0.4–2)
BUN SERPL-MCNC: 4 MG/DL — LOW (ref 8–20)
CALCIUM SERPL-MCNC: 8.4 MG/DL — LOW (ref 8.6–10.2)
CHLORIDE SERPL-SCNC: 107 MMOL/L — SIGNIFICANT CHANGE UP (ref 98–107)
CO2 SERPL-SCNC: 21 MMOL/L — LOW (ref 22–29)
CREAT SERPL-MCNC: 0.54 MG/DL — SIGNIFICANT CHANGE UP (ref 0.5–1.3)
EOSINOPHIL # BLD AUTO: 0.1 K/UL — SIGNIFICANT CHANGE UP (ref 0–0.5)
EOSINOPHIL NFR BLD AUTO: 1.5 % — SIGNIFICANT CHANGE UP (ref 0–6)
GLUCOSE SERPL-MCNC: 85 MG/DL — SIGNIFICANT CHANGE UP (ref 70–115)
HCG SERPL-ACNC: 194.4 MIU/ML — SIGNIFICANT CHANGE UP
HCT VFR BLD CALC: 37.2 % — SIGNIFICANT CHANGE UP (ref 37–47)
HGB BLD-MCNC: 12.4 G/DL — SIGNIFICANT CHANGE UP (ref 12–16)
LYMPHOCYTES # BLD AUTO: 3.1 K/UL — SIGNIFICANT CHANGE UP (ref 1–4.8)
LYMPHOCYTES # BLD AUTO: 41.5 % — SIGNIFICANT CHANGE UP (ref 20–55)
MCHC RBC-ENTMCNC: 27.3 PG — SIGNIFICANT CHANGE UP (ref 27–31)
MCHC RBC-ENTMCNC: 33.3 G/DL — SIGNIFICANT CHANGE UP (ref 32–36)
MCV RBC AUTO: 81.8 FL — SIGNIFICANT CHANGE UP (ref 81–99)
MONOCYTES # BLD AUTO: 0.7 K/UL — SIGNIFICANT CHANGE UP (ref 0–0.8)
MONOCYTES NFR BLD AUTO: 9.2 % — SIGNIFICANT CHANGE UP (ref 3–10)
NEUTROPHILS # BLD AUTO: 3.5 K/UL — SIGNIFICANT CHANGE UP (ref 1.8–8)
NEUTROPHILS NFR BLD AUTO: 46.8 % — SIGNIFICANT CHANGE UP (ref 37–73)
PLATELET # BLD AUTO: 229 K/UL — SIGNIFICANT CHANGE UP (ref 150–400)
POTASSIUM SERPL-MCNC: 3.4 MMOL/L — LOW (ref 3.5–5.3)
POTASSIUM SERPL-SCNC: 3.4 MMOL/L — LOW (ref 3.5–5.3)
PROT SERPL-MCNC: 7 G/DL — SIGNIFICANT CHANGE UP (ref 6.6–8.7)
RBC # BLD: 4.55 M/UL — SIGNIFICANT CHANGE UP (ref 4.4–5.2)
RBC # FLD: 14.1 % — SIGNIFICANT CHANGE UP (ref 11–15.6)
SODIUM SERPL-SCNC: 142 MMOL/L — SIGNIFICANT CHANGE UP (ref 135–145)
WBC # BLD: 7.38 K/UL — SIGNIFICANT CHANGE UP (ref 4.8–10.8)
WBC # FLD AUTO: 7.38 K/UL — SIGNIFICANT CHANGE UP (ref 4.8–10.8)

## 2017-01-04 RX ORDER — IBUPROFEN 200 MG
1 TABLET ORAL
Qty: 0 | Refills: 0 | COMMUNITY

## 2017-01-04 NOTE — PROGRESS NOTE ADULT - SUBJECTIVE AND OBJECTIVE BOX
31 year old female  [id number] LMP 16 EGA 2.1 who presents to the ED with complaints of abdominal pain & vaginal bleeding. The patient was previously admitted to [facility] for two days because of the same symptoms but was discharged with follow up at the [facility] clinic on Wednesday for repeat beta-hcg and TV ultrasound, however she had one episode of bleeding and abdominal pain and came to the ED. The patient states that the bleeding was bright red, with no passage of clots and no tissues were seen. Used two pads.  Denies vertigo, denies shortness of breath or chest pain.    The abdominal pain is located in the RLQ and the patient states that it also radiates to the epigastric region. She states that pain she has in RLQ is pulsating, but the pain she has in the epigastric region is burning and states it is similar to the burning pain she had when she had gastritis previously, but states that it is a bit stronger. She says the pain is 8/10, constant and gets worse with respiration. Not alleviated by ibuprofen.  Patient was recently treated for H. Pylori infection and she just finished the triple antibiotic therapy about 2 weeks ago.       OBHX 1  in  without complications, 1 csection due to breech presentation in   GYN hx-2015 PAP smear alondra    Vital Signs Last 24 Hrs  T(C): 36.7, Max: 36.9 ( @ 00:51)  T(F): 98, Max: 98.4 (- @ 00:51)  HR: 100 (89 - 106)  BP: 96/58 (92/60 - 126/74)  BP(mean): --  RR: 17 (16 - 18)  SpO2: 99% (99% - 100%)    Physical exam:  General: NAD  Cardiac: S1S2 RRR  Pulmonary: CTA bilateral   Abdominal: + BS, no palpable pain upon palpation to light or deep no guarding, no rebound  Extremities: no edema, no calf tenderness, no cyanosis      Labs:                                   13.3   10.37 )-----------( 235      ( 2017 22:22 )             40.6       2017 22:22    141    |  104    |  8.0    ----------------------------<  95     3.4     |  21.0   |  0.54     Ca    8.8        2017 22:22    TPro  7.8    /  Alb  4.1    /  TBili  0.2    /  DBili  x      /  AST  17     /  ALT  24     /  AlkPhos  104    2017 22:22    Beta-HCG=565 from 581 to 319  Ultrasound:   Right ovary: 7.3 x 4.2 x 5.7 cm containing 2 complex cysts measuring 3.7 x 3.5 x 3.3 cm and 2.8 x 3.0 x 2.6 cm. Blood flow demonstrated utilizing   color and spectral Doppler.  Left ovary: 2.6 x 2.3 x 1.8 cm., contains a nonspecific echogenic structure measuring approximately 7 mm. Blood flow demonstrated utilizing   color and spectral Doppler.  Free fluid:Moderate to large complex free fluid within the cul-de-sac and surrounding bilateral adnexa. No right upper quadrant free fluid. 31 year old female  [id number] LMP 16 EGA 2.3 with abdominal pain.  The pain has now resolved. no vaginal     OBHX 1  in  without complications, 1  due to breech presentation in   GYN hx-2015 PAP smear alondra    Vital Signs Last 24 Hrs  T(C): 36.7, Max: 36.9 ( @ 00:51)  T(F): 98, Max: 98.4 ( @ 00:51)  HR: 100 (89 - 106)  BP: 96/58 (92/60 - 126/74)  BP(mean): --  RR: 17 (16 - 18)  SpO2: 99% (99% - 100%)    Physical exam:  General: NAD  Cardiac: S1S2 RRR  Pulmonary: CTA bilateral   Abdominal: + BS, no palpable pain upon palpation to light or deep no guarding, no rebound  Extremities: no edema, no calf tenderness, no cyanosis      Labs:                                   13.3   10.37 )-----------( 235      ( 2017 22:22 )             40.6       2017 22:22    141    |  104    |  8.0    ----------------------------<  95     3.4     |  21.0   |  0.54     Ca    8.8        2017 22:22    TPro  7.8    /  Alb  4.1    /  TBili  0.2    /  DBili  x      /  AST  17     /  ALT  24     /  AlkPhos  104    2017 22:22    Beta-HCG=565 from 581 to 319  Ultrasound:   Right ovary: 7.3 x 4.2 x 5.7 cm containing 2 complex cysts measuring 3.7 x 3.5 x 3.3 cm and 2.8 x 3.0 x 2.6 cm. Blood flow demonstrated utilizing   color and spectral Doppler.  Left ovary: 2.6 x 2.3 x 1.8 cm., contains a nonspecific echogenic structure measuring approximately 7 mm. Blood flow demonstrated utilizing   color and spectral Doppler.  Free fluid:Moderate to large complex free fluid within the cul-de-sac and surrounding bilateral adnexa. No right upper quadrant free fluid. 31 year old female  [id number] LMP 16 EGA 2.3 with abdominal pain.  The pain has now resolved. no vaginal bleeding. no abdominal pain.  no complaints overnight, no dizziness no CP no SOB.    OBHX 1  in  without complications, 1  due to breech presentation in   GYN hx-2015 PAP smear alondra    Vital Signs Last 24 Hrs  T(C): 36.7, Max: 36.9 ( @ 00:51)  T(F): 98, Max: 98.4 ( @ 00:51)  HR: 100 (89 - 106)  BP: 96/58 (92/60 - 126/74)  BP(mean): --  RR: 17 (16 - 18)  SpO2: 99% (99% - 100%)    Physical exam:  General: NAD  Cardiac: S1S2 RRR  Pulmonary: CTA bilateral   Abdominal: + BS, no palpable pain upon palpation to light or deep no guarding, no rebound  Extremities: no edema, no calf tenderness, no cyanosis      Labs:                                   13.3   10.37 )-----------( 235      ( 2017 22:22 )             40.6       2017 22:22    141    |  104    |  8.0    ----------------------------<  95     3.4     |  21.0   |  0.54     Ca    8.8        2017 22:22    TPro  7.8    /  Alb  4.1    /  TBili  0.2    /  DBili  x      /  AST  17     /  ALT  24     /  AlkPhos  104    2017 22:22    Beta-HCG=565 from 581 to 319  Ultrasound:   Right ovary: 7.3 x 4.2 x 5.7 cm containing 2 complex cysts measuring 3.7 x 3.5 x 3.3 cm and 2.8 x 3.0 x 2.6 cm. Blood flow demonstrated utilizing   color and spectral Doppler.  Left ovary: 2.6 x 2.3 x 1.8 cm., contains a nonspecific echogenic structure measuring approximately 7 mm. Blood flow demonstrated utilizing   color and spectral Doppler.  Free fluid:Moderate to large complex free fluid within the cul-de-sac and surrounding bilateral adnexa. No right upper quadrant free fluid. 31 year old female  [id number] LMP 16 EGA 2.3 with abdominal pain and  in progress.   The pain has now resolved. no vaginal bleeding. no abdominal pain.  no complaints overnight, no dizziness no CP no SOB.    OBHX 1  in  without complications, 1  due to breech presentation in   GYN hx-2015 PAP smear alondra    Vital Signs Last 24 Hrs  T(C): 36.7, Max: 36.9 ( @ 00:51)  T(F): 98, Max: 98.4 ( @ 00:51)  HR: 100 (89 - 106)  BP: 96/58 (92/60 - 126/74)  BP(mean): --  RR: 17 (16 - 18)  SpO2: 99% (99% - 100%)    Physical exam:  General: NAD  Cardiac: S1S2 RRR  Pulmonary: CTA bilateral   Abdominal: + BS, no palpable pain upon palpation to light or deep no guarding, no rebound  Extremities: no edema, no calf tenderness, no cyanosis      Labs:                                   13.3   10.37 )-----------( 235      ( 2017 22:22 )             40.6       2017 22:22    141    |  104    |  8.0    ----------------------------<  95     3.4     |  21.0   |  0.54     Ca    8.8        2017 22:22    TPro  7.8    /  Alb  4.1    /  TBili  0.2    /  DBili  x      /  AST  17     /  ALT  24     /  AlkPhos  104    2017 22:22    Beta-HCG=565 from 581 to 319  Ultrasound:   Right ovary: 7.3 x 4.2 x 5.7 cm containing 2 complex cysts measuring 3.7 x 3.5 x 3.3 cm and 2.8 x 3.0 x 2.6 cm. Blood flow demonstrated utilizing   color and spectral Doppler.  Left ovary: 2.6 x 2.3 x 1.8 cm., contains a nonspecific echogenic structure measuring approximately 7 mm. Blood flow demonstrated utilizing   color and spectral Doppler.  Free fluid:Moderate to large complex free fluid within the cul-de-sac and surrounding bilateral adnexa. No right upper quadrant free fluid.

## 2017-01-04 NOTE — DISCHARGE NOTE OB - CARE PROVIDER_API CALL
Jude Macias (MD), Obstetrics and Gynecology  [facility] [place] [address]  Phone: [phone]  Fax: [phone]    Christian Whiteside), Obstetrics and Gynecology  [address] [place] [address]  Phone: [phone]  Fax: [phone]    Rachelle Tillman), Obstetrics and Gynecology  [address] [place] [address]  Phone: [phone]  Fax: [phone]

## 2017-01-04 NOTE — DISCHARGE NOTE OB - PLAN OF CARE
recovery Follow up in 1 week for repeat BHCG and close follow up till BHCG is 0  Follow up with your primary care physician in 1week after your discharged

## 2017-01-04 NOTE — DISCHARGE NOTE OB - PATIENT PORTAL LINK FT
“You can access the [facility] Patient Portal, offered by [facility], by registering with the following website: [email or web address]”

## 2017-01-04 NOTE — PROGRESS NOTE ADULT - ASSESSMENT
31 year old female  [id number] LMP 16 EGA 2.1 with resolving abdominal pain. 31 year old female  [id number] LMP 16 EGA 2.1 with resolving abdominal pain and  in progress.  Bhcg trending downward from 584 to 194.

## 2017-01-04 NOTE — PROGRESS NOTE ADULT - PROBLEM SELECTOR PLAN 1
Patient to get repeat bhcg today and possible discharge home today  Abdominal pain and vaginal bleeding resolved

## 2017-01-04 NOTE — DISCHARGE NOTE OB - HOSPITAL COURSE
31 year old female  [id number] LMP 16 EGA 2.1 who was admitted with complaints of abdominal pain & vaginal bleeding with pregnancy test . Beta-HCG is decreasing from previous value and was trended closely. BHCG 565 to 194.  Now with non-viable pregnancy,  in progress. Her abdominal pain was monitored closely and has now resolved.  She denies any more vaginal bleeding.         Problem/Plan - 1:  ·  Problem: Vaginal bleeding before 22 weeks gestation. Plan: -differentials were missed ,  ruptured cyst  or. ruptured ectopic pregnancy or ovarian torsion.  She was admitted to ob-gyn  under service  -serial beta-hcg were done  -ambulate, voiding and +flatus with bowel movements  -CBC/CMP  were checked daily  -tylenol for mild to  moderate pain    FINDINGS:  Uterus:  8.6 x 5.5 x 6.5 cm. Mildly heterogeneous endometrium measuring up to 1.9   cm, previously 1.3 cm. no evidence of intrauterine pregnancy.    Right ovary: 7.3 x 4.2 x 5.7 cm containing 2 complex cysts measuring 3.7   x 3.5 x 3.3 cm and 2.8 x 3.0 x 2.6 cm. Blood flow demonstrated utilizing   color and spectral Doppler.  Left ovary: 2.6 x 2.3 x 1.8 cm., contains a nonspecific echogenic   structure measuring approximately 7 mm. Blood flow demonstrated utilizing   color and spectral Doppler.    Free fluid:Moderate to large complex free fluid within the cul-de-sac and   surrounding bilateral adnexa. No right upper quadrant free fluid.    IMPRESSION:    Pregnancy of unknown location.    Moderate to large complex free fluid within the pelvis. Differential   diagnostic considerations again include ruptured cyst versus ruptured   ectopic pregnancy.    Interval increase in heterogeneity and thickness of the endometrium since   prior examination.    Two complex right ovarian cysts. No adnexal mass.

## 2017-01-04 NOTE — DISCHARGE NOTE OB - CARE PLAN
Principal Discharge DX:	Vaginal bleeding before 22 weeks gestation  Goal:	recovery  Instructions for follow-up, activity and diet:	Follow up in 1 week for repeat BHCG and close follow up till BHCG is 0  Follow up with your primary care physician in 1week after your discharged

## 2017-03-12 PROCEDURE — 81025 URINE PREGNANCY TEST: CPT

## 2017-03-12 PROCEDURE — 76817 TRANSVAGINAL US OBSTETRIC: CPT

## 2017-03-12 PROCEDURE — 84702 CHORIONIC GONADOTROPIN TEST: CPT

## 2017-03-12 PROCEDURE — 86901 BLOOD TYPING SEROLOGIC RH(D): CPT

## 2017-03-12 PROCEDURE — 76801 OB US < 14 WKS SINGLE FETUS: CPT

## 2017-03-12 PROCEDURE — 81001 URINALYSIS AUTO W/SCOPE: CPT

## 2017-03-12 PROCEDURE — T1013: CPT

## 2017-03-12 PROCEDURE — 80053 COMPREHEN METABOLIC PANEL: CPT

## 2017-03-12 PROCEDURE — 85027 COMPLETE CBC AUTOMATED: CPT

## 2017-03-12 PROCEDURE — 76705 ECHO EXAM OF ABDOMEN: CPT

## 2017-03-12 PROCEDURE — 99285 EMERGENCY DEPT VISIT HI MDM: CPT | Mod: 25

## 2017-03-12 PROCEDURE — 86900 BLOOD TYPING SEROLOGIC ABO: CPT

## 2017-03-12 PROCEDURE — 36415 COLL VENOUS BLD VENIPUNCTURE: CPT

## 2017-03-12 PROCEDURE — 86850 RBC ANTIBODY SCREEN: CPT

## 2019-05-31 NOTE — ED STATDOCS - CHPI ED RELIEVING FACTORS
I have personally performed a face to face diagnostic evaluation on this patient. I have reviewed the ACP note and agree with the history, exam and plan of care, except as noted.
nothing

## 2019-07-21 NOTE — H&P ADULT. - VENOUS THROMBOEMBOLISM FOR WOMEN ONLY
none Recognize danger situations.  For example, stress, drinking alcohol, urges to smoke, smoking cues, cigarette availability

## 2020-01-04 NOTE — H&P ADULT. - CONSTITUTIONAL DETAILS
Please take Acetaminophen over the counter. Use as directed and see medication warnings.  Please take Naprosyn as prescribed by your doctor. Use as directed and see medication warnings.  Please follow up with your primary care physician within 1 week.  Please follow up with your orthopedic surgeon.   Please bring a copy of your results with you.  Please return to the emergency department for worsening of your symptoms. no distress/well-developed/well-nourished/well-groomed

## 2020-10-17 NOTE — ED STATDOCS - PROGRESS NOTE ADDITIONAL1
Denies VB/cramping doing well reviewed follow up US and sub bleeding is slightly larger but with no bleeding likely will resolve, sent for repeat TSH today and panorama level 2 ordered bleeding precautions reviewed all questions answered    Additional Progress Note...

## 2021-03-22 ENCOUNTER — APPOINTMENT (OUTPATIENT)
Dept: ANTEPARTUM | Facility: CLINIC | Age: 36
End: 2021-03-22

## 2021-04-05 ENCOUNTER — ASOB RESULT (OUTPATIENT)
Age: 36
End: 2021-04-05

## 2021-04-05 ENCOUNTER — APPOINTMENT (OUTPATIENT)
Dept: ANTEPARTUM | Facility: CLINIC | Age: 36
End: 2021-04-05
Payer: SELF-PAY

## 2021-04-05 PROCEDURE — 76830 TRANSVAGINAL US NON-OB: CPT

## 2021-04-05 PROCEDURE — 76856 US EXAM PELVIC COMPLETE: CPT | Mod: 59

## 2021-07-07 NOTE — ED STATDOCS - PROGRESS NOTE DETAILS
25.1 spoke to radiologist dolores, US still the same as it was x 2 days ago, ddx ruptured ectopic vs ruptured cyst with pregnancy of unknown location, patient was admitted to [facility] x 2 days ago and released yesterday c/o worsening pain and vaginal bleeding, GYN called for re-eval patient re-evaluated c/o persistent pain and vaginal bleeding, was dicharged yesterday, admits to "gush of blood upon standing or changing positions" deferred vaginal exam until evaluated by GYN pt admitted to gyn as signed out by dr. alvarenga

## 2022-03-30 ENCOUNTER — APPOINTMENT (OUTPATIENT)
Dept: ANTEPARTUM | Facility: CLINIC | Age: 37
End: 2022-03-30

## 2022-04-06 ENCOUNTER — ASOB RESULT (OUTPATIENT)
Age: 37
End: 2022-04-06

## 2022-04-06 ENCOUNTER — APPOINTMENT (OUTPATIENT)
Dept: ANTEPARTUM | Facility: CLINIC | Age: 37
End: 2022-04-06
Payer: SELF-PAY

## 2022-04-06 PROCEDURE — 76856 US EXAM PELVIC COMPLETE: CPT | Mod: 59

## 2022-04-06 PROCEDURE — 76830 TRANSVAGINAL US NON-OB: CPT

## 2024-01-02 ENCOUNTER — APPOINTMENT (OUTPATIENT)
Dept: ULTRASOUND IMAGING | Facility: CLINIC | Age: 39
End: 2024-01-02
Payer: COMMERCIAL

## 2024-01-02 ENCOUNTER — APPOINTMENT (OUTPATIENT)
Dept: MAMMOGRAPHY | Facility: CLINIC | Age: 39
End: 2024-01-02
Payer: COMMERCIAL

## 2024-01-02 ENCOUNTER — OUTPATIENT (OUTPATIENT)
Dept: OUTPATIENT SERVICES | Facility: HOSPITAL | Age: 39
LOS: 1 days | End: 2024-01-02
Payer: COMMERCIAL

## 2024-01-02 DIAGNOSIS — D17.9 BENIGN LIPOMATOUS NEOPLASM, UNSPECIFIED: Chronic | ICD-10-CM

## 2024-01-02 DIAGNOSIS — N64.4 MASTODYNIA: ICD-10-CM

## 2024-01-02 DIAGNOSIS — R92.8 OTHER ABNORMAL AND INCONCLUSIVE FINDINGS ON DIAGNOSTIC IMAGING OF BREAST: ICD-10-CM

## 2024-01-02 DIAGNOSIS — Z98.89 OTHER SPECIFIED POSTPROCEDURAL STATES: Chronic | ICD-10-CM

## 2024-01-02 PROCEDURE — 76641 ULTRASOUND BREAST COMPLETE: CPT | Mod: 26,50

## 2024-01-02 PROCEDURE — 77066 DX MAMMO INCL CAD BI: CPT

## 2024-01-02 PROCEDURE — G0279: CPT | Mod: 26

## 2024-01-02 PROCEDURE — 76641 ULTRASOUND BREAST COMPLETE: CPT

## 2024-01-02 PROCEDURE — 77066 DX MAMMO INCL CAD BI: CPT | Mod: 26

## 2024-01-02 PROCEDURE — G0279: CPT
